# Patient Record
Sex: FEMALE | Race: WHITE | HISPANIC OR LATINO
[De-identification: names, ages, dates, MRNs, and addresses within clinical notes are randomized per-mention and may not be internally consistent; named-entity substitution may affect disease eponyms.]

---

## 2021-07-02 PROBLEM — Z00.00 ENCOUNTER FOR PREVENTIVE HEALTH EXAMINATION: Status: ACTIVE | Noted: 2021-07-02

## 2021-07-07 ENCOUNTER — APPOINTMENT (OUTPATIENT)
Dept: HEART AND VASCULAR | Facility: CLINIC | Age: 46
End: 2021-07-07
Payer: MEDICAID

## 2021-07-07 ENCOUNTER — NON-APPOINTMENT (OUTPATIENT)
Age: 46
End: 2021-07-07

## 2021-07-07 VITALS
SYSTOLIC BLOOD PRESSURE: 149 MMHG | BODY MASS INDEX: 37.28 KG/M2 | DIASTOLIC BLOOD PRESSURE: 75 MMHG | HEIGHT: 68 IN | HEART RATE: 78 BPM | TEMPERATURE: 97.7 F | WEIGHT: 246 LBS

## 2021-07-07 DIAGNOSIS — K21.9 GASTRO-ESOPHAGEAL REFLUX DISEASE W/OUT ESOPHAGITIS: ICD-10-CM

## 2021-07-07 DIAGNOSIS — M79.7 FIBROMYALGIA: ICD-10-CM

## 2021-07-07 DIAGNOSIS — R06.81 GASTRO-ESOPHAGEAL REFLUX DISEASE W/OUT ESOPHAGITIS: ICD-10-CM

## 2021-07-07 DIAGNOSIS — G47.33 OBSTRUCTIVE SLEEP APNEA (ADULT) (PEDIATRIC): ICD-10-CM

## 2021-07-07 DIAGNOSIS — E11.9 TYPE 2 DIABETES MELLITUS W/OUT COMPLICATIONS: ICD-10-CM

## 2021-07-07 DIAGNOSIS — Z78.9 OTHER SPECIFIED HEALTH STATUS: ICD-10-CM

## 2021-07-07 DIAGNOSIS — F31.9 BIPOLAR DISORDER, UNSPECIFIED: ICD-10-CM

## 2021-07-07 PROCEDURE — 99203 OFFICE O/P NEW LOW 30 MIN: CPT | Mod: 25

## 2021-07-07 PROCEDURE — 93291 INTERROG DEV EVAL SCRMS IP: CPT

## 2021-07-07 RX ORDER — FAMOTIDINE 20 MG/1
20 TABLET, FILM COATED ORAL
Refills: 0 | Status: ACTIVE | COMMUNITY
Start: 2021-07-07

## 2021-07-07 RX ORDER — ALBUTEROL SULFATE 90 UG/1
108 (90 BASE) AEROSOL, METERED RESPIRATORY (INHALATION)
Refills: 0 | Status: ACTIVE | COMMUNITY
Start: 2021-07-07

## 2021-07-07 RX ORDER — METFORMIN HYDROCHLORIDE 500 MG/1
500 TABLET, COATED ORAL
Refills: 0 | Status: ACTIVE | COMMUNITY
Start: 2021-07-07

## 2021-07-13 ENCOUNTER — TRANSCRIPTION ENCOUNTER (OUTPATIENT)
Age: 46
End: 2021-07-13

## 2021-07-13 NOTE — REVIEW OF SYSTEMS
[Fever] : no fever [Chills] : no chills [SOB] : no shortness of breath [Dyspnea on exertion] : not dyspnea during exertion [Palpitations] : no palpitations [Cough] : no cough [Negative] : Heme/Lymph

## 2021-07-13 NOTE — PHYSICAL EXAM
[Well Developed] : well developed [Well Nourished] : well nourished [No Acute Distress] : no acute distress [Normal Conjunctiva] : normal conjunctiva [Normal S1, S2] : normal S1, S2 [Clear Lung Fields] : clear lung fields [Good Air Entry] : good air entry [No Respiratory Distress] : no respiratory distress  [Soft] : abdomen soft [Normal Gait] : normal gait [No Edema] : no edema [No Rash] : no rash [Moves all extremities] : moves all extremities [No Focal Deficits] : no focal deficits [Alert and Oriented] : alert and oriented [Normal memory] : normal memory

## 2021-07-13 NOTE — HISTORY OF PRESENT ILLNESS
[FreeTextEntry1] : 46 year old female with diabetes, sleep apnea, bipolar disorder, HTN, asthma and syncope s/p ILR who presents for initial evaluation.\par \par She states she had an ILR placed in 2019 and was never given a home monitor or follow up.  She has had ER visits since then and states that no one has checked her ILR.  She has recurrent near syncope / syncope She states that she gets lightheaded and then "looses consciousness for a couple of seconds.  SHe denies ever hurting herself.  She takes her BP/HR after these episodes (see scanned document) and readings are WNL.  She has an appointment with neurology for an EEG and MRI but states she needs her ILR checked first.  She was admitted to [facility] 6/2021 in which it states that her ILR was not checked, orthostatic negative.  This was her fist episode since 2019.  \par \par No further syncope since she was at [facility] 6/2021.  No chest pain, SOB, orthopnea, PND, edema.

## 2021-07-13 NOTE — DISCUSSION/SUMMARY
[FreeTextEntry1] : 46 year old female with diabetes, sleep apnea, bipolar disorder, HTN, asthma and syncope s/p ILR who presents for initial evaluation.  ILR identified after troubleshooting as a [facility].  Remaining battery about 30%.  No events/arrhythmias recorded.  At this time she would like regular follow up before getting a remote monitor while she sees about her insurance.  She should proceed with her neurology workup as scheduled.  She should stay well hydrated and avoid warm crowded places, rapid changes in position and large meals.  Instead she should eat regular small meals.  She will follow up in 6 months or sooner if needed and knows to call with any questions or concerns.  \par

## 2021-09-05 ENCOUNTER — EMERGENCY (EMERGENCY)
Facility: HOSPITAL | Age: 46
LOS: 1 days | Discharge: HOME | End: 2021-09-05
Admitting: EMERGENCY MEDICINE
Payer: COMMERCIAL

## 2021-09-05 VITALS
RESPIRATION RATE: 18 BRPM | WEIGHT: 274.92 LBS | DIASTOLIC BLOOD PRESSURE: 77 MMHG | HEART RATE: 72 BPM | TEMPERATURE: 98 F | OXYGEN SATURATION: 98 % | SYSTOLIC BLOOD PRESSURE: 146 MMHG

## 2021-09-05 VITALS
TEMPERATURE: 98 F | RESPIRATION RATE: 16 BRPM | OXYGEN SATURATION: 96 % | SYSTOLIC BLOOD PRESSURE: 126 MMHG | DIASTOLIC BLOOD PRESSURE: 78 MMHG

## 2021-09-05 PROCEDURE — 99284 EMERGENCY DEPT VISIT MOD MDM: CPT

## 2021-09-05 RX ORDER — DIPHENHYDRAMINE HCL 50 MG
50 CAPSULE ORAL ONCE
Refills: 0 | Status: COMPLETED | OUTPATIENT
Start: 2021-09-05 | End: 2021-09-05

## 2021-09-05 RX ORDER — DIPHENHYDRAMINE HCL 50 MG
1 CAPSULE ORAL
Qty: 12 | Refills: 0
Start: 2021-09-05 | End: 2021-09-07

## 2021-09-05 RX ORDER — FAMOTIDINE 10 MG/ML
20 INJECTION INTRAVENOUS ONCE
Refills: 0 | Status: COMPLETED | OUTPATIENT
Start: 2021-09-05 | End: 2021-09-05

## 2021-09-05 RX ORDER — DIPHENHYDRAMINE HCL 50 MG
50 CAPSULE ORAL ONCE
Refills: 0 | Status: DISCONTINUED | OUTPATIENT
Start: 2021-09-05 | End: 2021-09-05

## 2021-09-05 RX ADMIN — FAMOTIDINE 20 MILLIGRAM(S): 10 INJECTION INTRAVENOUS at 11:27

## 2021-09-05 RX ADMIN — Medication 125 MILLIGRAM(S): at 11:22

## 2021-09-05 RX ADMIN — Medication 50 MILLIGRAM(S): at 11:21

## 2021-09-05 NOTE — ED ADULT TRIAGE NOTE - CHIEF COMPLAINT QUOTE
here for allergic reaction after eating eggs, corn beef hash and hash browns- pt self administered 2 Epi pens PTA- due to chest tightness and itching to head- pt with h/o asthma

## 2021-09-05 NOTE — ED ADULT NURSE NOTE - OBJECTIVE STATEMENT
Pt walked in c/o allergic reaction of unknown substance pt has a list of allergies as per pt admin 2 epi pens at home , currently feeling chest tightness and itches to overall body, pt vss in no acute distress at this time, partner at the bedside reagan murray

## 2021-09-05 NOTE — ED PROVIDER NOTE - PATIENT PORTAL LINK FT
You can access the FollowMyHealth Patient Portal offered by [facility] by registering at the following website: [email or web address]. By joining [facility]’s FollowMyHealth portal, you will also be able to view your health information using other applications (apps) compatible with our system.
(0) swallows foods and liquids w/o difficulty

## 2021-09-05 NOTE — ED PROVIDER NOTE - OBJECTIVE STATEMENT
47 y/o F coming in with anaphylaxis from the house. She gave herself 2 Epi's in the field . 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 She is unsure what sparked this reaction. Pt now appears well, maintaining her airway.

## 2021-09-05 NOTE — ED PROVIDER NOTE - WET READ LAUNCH FT
Detail Level: Simple Additional Notes: Patient consent was obtained to proceed with the visit and recommended plan of care after discussion of all risks and benefits, including the risks of COVID-19 exposure. There are no Wet Read(s) to document.

## 2021-09-05 NOTE — ED PROVIDER NOTE - CLINICAL SUMMARY MEDICAL DECISION MAKING FREE TEXT BOX
47 y/o F, coming in with anaphylaxis and gave herself 2 Epi's in the field prior to arrival. Pt appears well, maintaining her airway. Plan is to administer Benadryl, Steroids, and watch.
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
## 2021-09-08 DIAGNOSIS — T78.40XA ALLERGY, UNSPECIFIED, INITIAL ENCOUNTER: ICD-10-CM

## 2021-09-08 DIAGNOSIS — Z88.8 ALLERGY STATUS TO OTHER DRUGS, MEDICAMENTS AND BIOLOGICAL SUBSTANCES STATUS: ICD-10-CM

## 2021-09-08 DIAGNOSIS — Z88.1 ALLERGY STATUS TO OTHER ANTIBIOTIC AGENTS STATUS: ICD-10-CM

## 2021-09-08 DIAGNOSIS — T78.2XXA ANAPHYLACTIC SHOCK, UNSPECIFIED, INITIAL ENCOUNTER: ICD-10-CM

## 2021-09-08 DIAGNOSIS — Z88.0 ALLERGY STATUS TO PENICILLIN: ICD-10-CM

## 2021-09-08 DIAGNOSIS — Z91.041 RADIOGRAPHIC DYE ALLERGY STATUS: ICD-10-CM

## 2022-01-12 ENCOUNTER — APPOINTMENT (OUTPATIENT)
Dept: HEART AND VASCULAR | Facility: CLINIC | Age: 47
End: 2022-01-12

## 2024-02-12 NOTE — ED ADULT NURSE NOTE - NS ED NOTE  TALK SOMEONE YN
S: 67 y.o. female here for TCM for proctitis and rectal prolapse, s/p repair.   Normal BMs, no blood.   Some weakness and body aches. Mostly sits at home. Has plans to do PT if ok with surgeon.   Some orosco walking to bedside commode. Imaging showed patchy infiltrates. No concerning cough. Has appt w/ Pulm tomorrow.    O: VS: BP (!) 145/86 (Site: Left Upper Arm, Position: Sitting)   Pulse 77   Temp 97.7 °F (36.5 °C)   Resp 18   Ht 1.6 m (5' 2.99\")   Wt 59.9 kg (132 lb)   LMP  (LMP Unknown)   SpO2 96%   BMI 23.39 kg/m²    General: NAD, alert and interacting appropriately.    CV:  RRR, no gallops, rubs, or murmurs    Resp: mild crackles   Abd:  Soft, nontender, ventral hernia   Ext:  No edema    Impression: TCM for rectal prolapse. Anemia.   Plan:   Cont miralax once daily for now to avoid strain given recent surgery  See pulm   CBC  Muscle relaxer for rectal discomfort    Attending Physician Statement  I have discussed the case, including pertinent history and exam findings with the resident. I also have seen the patient and performed key portions of the examination.  I agree with the documented assessment and plan.      
She has a follow-up appointment with general surgery on 2/21/2024. They are concerned with the volume and frequency of her bowel movements and recently decreased her MiraLax to once daily.    She states she has been struggling with weakness, dyspnea on exertion, and body aches. She has home physical therapy ordered and had her initial visit from them today. She states she ordinarily does strengthening exercises with her , but she wants to avoid increased intra-abdominal pressure with her recent surgery so she has been \"taking it slow\".    In the hospital, patient had a 2 VW CXR that showed atelectasis and patchy infiltrates. She has a cough productive of clear sputum which is her baseline. She has been afebrile. States she is in bed a lot. Has an incentive spirometer at home but has not been using it. Has an appointment with Dr. Roy with pulm tomorrow.     Inpatient course: Discharge summary reviewed- see chart.    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)    Pulmonary sarcoidosis (HCC)    Steroid-induced avascular necrosis of shoulder (HCC)    Anemia due to chronic illness    Chronic pain syndrome    Bilateral hip pain    Debility    Altered mental status    BRBPR (bright red blood per rectum)    Severe pulmonary hypertension (HCC)    Pulmonary hypertension    Chronic kidney disease, stage III (moderate) (HCC)    Paroxysmal atrial fibrillation (HCC)    Mixed hyperlipidemia    Goals of care, counseling/discussion    Atrial fibrillation with RVR (HCC)    Chronic combined systolic and diastolic heart failure (HCC)    Chronic hypoxemic respiratory failure (HCC)    Mixed simple and mucopurulent chronic bronchitis (HCC)    Recurrent major depressive disorder, in partial remission (HCC)    Gait disturbance    Chest pain    Chronic, continuous use of opioids    PMB (postmenopausal bleeding)    
No
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
